# Patient Record
Sex: FEMALE | Race: WHITE | Employment: FULL TIME | URBAN - METROPOLITAN AREA
[De-identification: names, ages, dates, MRNs, and addresses within clinical notes are randomized per-mention and may not be internally consistent; named-entity substitution may affect disease eponyms.]

---

## 2017-01-06 ENCOUNTER — MYC REFILL (OUTPATIENT)
Dept: FAMILY MEDICINE | Facility: CLINIC | Age: 37
End: 2017-01-06

## 2017-01-06 DIAGNOSIS — M79.7 FIBROMYALGIA: ICD-10-CM

## 2017-01-06 RX ORDER — HYDROCODONE BITARTRATE AND ACETAMINOPHEN 5; 325 MG/1; MG/1
TABLET ORAL
Qty: 30 TABLET | Refills: 0 | Status: SHIPPED | OUTPATIENT
Start: 2017-01-06 | End: 2017-05-01

## 2017-01-06 NOTE — TELEPHONE ENCOUNTER
Rx at the  for . My Chart message sent to the pt with the note below to schedule a follow up appt.  Sara Keller,

## 2017-01-06 NOTE — TELEPHONE ENCOUNTER
Message from [employer]:  Original authorizing provider: MD Scarlet Lorenz would like a refill of the following medications:  HYDROcodone-acetaminophen (NORCO) 5-325 MG per tablet [Davide Caro MD]    Preferred pharmacy: [facility] [address] [place] [address] PRIYANKA ESTEVEZ AT [facility] [address]     Comment:

## 2017-01-06 NOTE — TELEPHONE ENCOUNTER
Controlled Substance Refill Request for Norco 5-325  Problem List Complete:  No     PROVIDER TO CONSIDER COMPLETION OF PROBLEM LIST AND OVERVIEW/CONTROLLED SUBSTANCE AGREEMENT    Last Written Prescription Date:  11/17/2016  Last Fill Quantity: 30,   # refills: 0    Last Office Visit with [facility] primary care provider: 6/1/2016    Future Office visit:     Controlled substance agreement on file: Yes:  Date 6/2/2016.     Processing:  Mail to on file pharmacy   checked in past 6 months?  Yes 01/6/2017     RX [place] ([place] reviewed:  reviewed- no concerns 1/6/2016    [place] profile:  [email or web address]  Clarice Bernabe RN  [employer] [employer]

## 2017-01-16 DIAGNOSIS — F41.1 ANXIETY STATE: Primary | ICD-10-CM

## 2017-01-17 DIAGNOSIS — M79.7 FIBROMYALGIA: Primary | ICD-10-CM

## 2017-01-17 RX ORDER — CARISOPRODOL 350 MG/1
TABLET ORAL
Qty: 21 TABLET | Refills: 0 | Status: SHIPPED | OUTPATIENT
Start: 2017-01-17 | End: 2017-05-01

## 2017-01-17 NOTE — TELEPHONE ENCOUNTER
Need follow for med check per Dr. Caro     Unable to reach patient at this time or leave message as no VM box set up.   Route to team to call patient and assist with scheduling.      Holly Jimenez RN

## 2017-01-17 NOTE — Clinical Note
[facility]  [address] [facility] [address]  [phone]        January 18, 2017  Scarlet Osborne  [address] [place] [phone]    Dear Scarlet,    I care about your health and have reviewed your health plan. I have reviewed your medical conditions, medication list, and lab results and am making recommendations based on this review, to better manage your health.    You are in particular need of attention regarding:  -Med Check    I am recommending that you:  Schedule and appt ASAP  Here is a list of Health Maintenance topics that are due now or due soon:  Health Maintenance Due   Topic Date Due     Chlamydia Screening Lab - yearly  1979     CRISELDA QUESTIONNAIRE 1 YEAR  1979     Depression Assessment - yearly  1979     URINE DRUG SCREEN Q1 YR  03/20/1994       Please call us at [phone] (or use [employer]) to address the above recommendations.     Thank you for trusting [facility] and we appreciate the opportunity to serve you.  We look forward to supporting your healthcare needs in the future.    Healthy Regards,    Davide Caro M.D.

## 2017-01-17 NOTE — TELEPHONE ENCOUNTER
CARISOPRODOL 350MG TABLETS      Last Written Prescription Date:  NA  Last Fill Quantity: NA,   # refills: NA  Last Office Visit with [facility], [facility] or [facility] prescribing provider: 6/1/2016  Future Office visit:       Routing refill request to provider for review/approval because:  Drug not active on patient's medication list

## 2017-01-17 NOTE — TELEPHONE ENCOUNTER
PAROXETINE 10MG TABLETS     Last Written Prescription Date: 10/5/2016  Last Fill Quantity: 30, # refills: 1  Last Office Visit with [facility] primary care provider:  6/1/2016        Last PHQ-9 score on record= No flowsheet data found.

## 2017-01-18 NOTE — TELEPHONE ENCOUNTER
Unable to contact the pt. Rx faxed to [facility]i [facility]. Letter mailed to the pt to schedule a med check.  Sara Keller,

## 2017-01-27 RX ORDER — PAROXETINE 10 MG/1
TABLET, FILM COATED ORAL
Qty: 30 TABLET | Refills: 0 | OUTPATIENT
Start: 2017-01-27

## 2017-02-07 DIAGNOSIS — I10 ESSENTIAL HYPERTENSION, BENIGN: Primary | ICD-10-CM

## 2017-02-08 RX ORDER — HYDROCHLOROTHIAZIDE 12.5 MG/1
TABLET ORAL
Qty: 30 TABLET | Refills: 0 | Status: SHIPPED | OUTPATIENT
Start: 2017-02-08 | End: 2017-04-19

## 2017-02-08 RX ORDER — LOSARTAN POTASSIUM 50 MG/1
TABLET ORAL
Qty: 30 TABLET | Refills: 0 | Status: SHIPPED | OUTPATIENT
Start: 2017-02-08 | End: 2017-04-22

## 2017-02-08 NOTE — TELEPHONE ENCOUNTER
HCTZ      Last Written Prescription Date: 3/8/16  Last Fill Quantity: 90, # refills: 1  Last Office Visit with [facility], [facility] or [facility] prescribing provider: 6/1/16       POTASSIUM   Date Value Ref Range Status   06/01/2016 3.7 3.4 - 5.3 mmol/L Final     CREATININE   Date Value Ref Range Status   06/01/2016 0.82 0.52 - 1.04 mg/dL Final     BP Readings from Last 3 Encounters:   06/01/16 106/69   09/03/15 104/80   07/10/14 118/82     Losartan      Last Written Prescription Date: 2/18/16  Last Fill Quantity: 90, # refills: 1  Last Office Visit with [facility], [facility] or [facility] prescribing provider: 6/1/16       POTASSIUM   Date Value Ref Range Status   06/01/2016 3.7 3.4 - 5.3 mmol/L Final     CREATININE   Date Value Ref Range Status   06/01/2016 0.82 0.52 - 1.04 mg/dL Final     BP Readings from Last 3 Encounters:   06/01/16 106/69   09/03/15 104/80   07/10/14 118/82     Teresita Rome CMA

## 2017-02-08 NOTE — TELEPHONE ENCOUNTER
"Ok x one month only- per last office visit patient due for med check in 6 months.  Called home number \" this person cannot accept calls at this time\"  Message sent by  to schedule appointment for med check  I sent a message today to schedule before the 30 day supply runs out.  Shira Devine RN  [facility]  [phone]    "

## 2017-02-13 DIAGNOSIS — F41.1 ANXIETY STATE: ICD-10-CM

## 2017-02-13 NOTE — TELEPHONE ENCOUNTER
Paroxetine      Last Written Prescription Date: 10/5/16  Last Fill Quantity: 30, # refills: 1  Last Office Visit with [facility] primary care provider:  6/1/16        Last PHQ-9 score on record= No flowsheet data found.      Teresita Rome CMA

## 2017-02-15 NOTE — TELEPHONE ENCOUNTER
Sent CRISELDA and request for OV via [employer].  Shira DevineRN  [facility]  [phone]

## 2017-02-20 RX ORDER — PAROXETINE 10 MG/1
TABLET, FILM COATED ORAL
Qty: 90 TABLET | Refills: 1 | Status: SHIPPED | OUTPATIENT
Start: 2017-02-20

## 2017-02-20 NOTE — TELEPHONE ENCOUNTER
Routing refill request to provider for review/approval because:  A break in medication  Patients number is out of service and has not reviewed [employer] message  Shira Devine RN  [facility]  [phone]

## 2017-03-04 DIAGNOSIS — F41.1 ANXIETY STATE: ICD-10-CM

## 2017-03-06 RX ORDER — ALPRAZOLAM 0.25 MG
TABLET ORAL
Qty: 30 TABLET | Refills: 0 | Status: SHIPPED | OUTPATIENT
Start: 2017-03-06

## 2017-03-06 NOTE — TELEPHONE ENCOUNTER
Alprazolam      Last Written Prescription Date:  11/7/16  Last Fill Quantity: 30,   # refills: 0  Last Office Visit with [facility], [facility] or [facility] prescribing provider: 6/1/16  Future Office visit:       Routing refill request to provider for review/approval because:  Drug not on the [facility], [facility] or [facility] refill protocol or controlled substance    Teresita Rome CMA

## 2017-04-19 DIAGNOSIS — I10 ESSENTIAL HYPERTENSION, BENIGN: ICD-10-CM

## 2017-04-20 RX ORDER — HYDROCHLOROTHIAZIDE 12.5 MG/1
TABLET ORAL
Qty: 30 TABLET | Refills: 0 | Status: SHIPPED | OUTPATIENT
Start: 2017-04-20

## 2017-04-20 NOTE — TELEPHONE ENCOUNTER
HCTZ      Last Written Prescription Date: 2/8/17  Last Fill Quantity: 30, # refills: 0  Last Office Visit with [facility], [facility] or [facility] prescribing provider: 6/1/16  Next 5 appointments (look out 90 days)     Apr 21, 2017 11:20 AM CDT   Kathy Proctor with Davide Caro MD   [facility] ([facility]    [address] [place] [id number] [phone]                   Potassium   Date Value Ref Range Status   06/01/2016 3.7 3.4 - 5.3 mmol/L Final     Creatinine   Date Value Ref Range Status   06/01/2016 0.82 0.52 - 1.04 mg/dL Final     BP Readings from Last 3 Encounters:   06/01/16 106/69   09/03/15 104/80   07/10/14 118/82     Teresita Rome [facility]

## 2017-04-22 DIAGNOSIS — I10 ESSENTIAL HYPERTENSION, BENIGN: ICD-10-CM

## 2017-04-24 RX ORDER — LOSARTAN POTASSIUM 50 MG/1
TABLET ORAL
Qty: 30 TABLET | Refills: 3 | Status: SHIPPED | OUTPATIENT
Start: 2017-04-24

## 2017-04-24 NOTE — TELEPHONE ENCOUNTER
Refill approved through [facility] protocol.  Shira Devine RN  [facility]  [phone]

## 2017-04-24 NOTE — TELEPHONE ENCOUNTER
Losartan 50 mg       Last Written Prescription Date: 2/8/17  Last Fill Quantity: 30, # refills: 0  Last Office Visit with [facility], [facility] or [facility] prescribing provider: 6/1/16       Potassium   Date Value Ref Range Status   06/01/2016 3.7 3.4 - 5.3 mmol/L Final     Creatinine   Date Value Ref Range Status   06/01/2016 0.82 0.52 - 1.04 mg/dL Final     BP Readings from Last 3 Encounters:   06/01/16 106/69   09/03/15 104/80   07/10/14 118/82

## 2017-05-01 ENCOUNTER — MYC REFILL (OUTPATIENT)
Dept: FAMILY MEDICINE | Facility: CLINIC | Age: 37
End: 2017-05-01

## 2017-05-01 DIAGNOSIS — M79.7 FIBROMYALGIA: ICD-10-CM

## 2017-05-03 RX ORDER — HYDROCODONE BITARTRATE AND ACETAMINOPHEN 5; 325 MG/1; MG/1
TABLET ORAL
Qty: 30 TABLET | Refills: 0 | Status: SHIPPED | OUTPATIENT
Start: 2017-05-03

## 2017-05-03 RX ORDER — CARISOPRODOL 350 MG/1
350 TABLET ORAL 3 TIMES DAILY PRN
Qty: 21 TABLET | Refills: 0 | Status: SHIPPED | OUTPATIENT
Start: 2017-05-03

## 2017-05-03 NOTE — TELEPHONE ENCOUNTER
Rx's at the  for . My CHart message sent to the pt to schedule a follow up with Dr Caro.  Sara Keller,

## 2017-05-03 NOTE — TELEPHONE ENCOUNTER
Message from [employer]:  Original authorizing provider: MD Scarlet Lorenz would like a refill of the following medications:  HYDROcodone-acetaminophen (NORCO) 5-325 MG per tablet [Davide Caro MD]  carisoprodol (SOMA) 350 MG tablet [Davide Caro MD]    Preferred pharmacy: [facility] [address] [place] [address] AT [facility] [address]     Comment:

## 2017-05-03 NOTE — TELEPHONE ENCOUNTER
Controlled Substance Refill Request for norco  Problem List Complete:  No     PROVIDER TO CONSIDER COMPLETION OF PROBLEM LIST AND OVERVIEW/CONTROLLED SUBSTANCE AGREEMENT    Last Written Prescription Date:  1/17/17  Last Fill Quantity: 21,   # refills: 0    Last Office Visit with [facility] primary care provider: 06/01/2016    Future Office visit:     Controlled substance agreement on file: No.     Processing:  Patient will  in clinic   checked in past 6 months?  No, route to RN-  RX monitoring program ([place]) reviewed: provider has not granted access to triage    [place]MP profile:  [email or web address]        Soma      Last Written Prescription Date:  01/06/17  Last Fill Quantity: 30,   # refills: 0  Last Office Visit with [facility], [facility] or [facility] prescribing provider: 06/01/16  Future Office visit:       Routing refill request to provider for review/approval because:  Drug not on the [facility], [facility] or [facility] refill protocol or controlled substance    Shira Devine RN  [facility]  [phone]

## 2017-06-10 ENCOUNTER — MYC REFILL (OUTPATIENT)
Dept: FAMILY MEDICINE | Facility: CLINIC | Age: 37
End: 2017-06-10

## 2017-06-10 DIAGNOSIS — F41.1 ANXIETY STATE: ICD-10-CM

## 2017-06-10 DIAGNOSIS — M79.7 FIBROMYALGIA: ICD-10-CM

## 2017-06-13 NOTE — TELEPHONE ENCOUNTER
Patient overdue for appointment - please schedule when patient calls back before sending call to triage      alprazolam      Last Written Prescription Date:  03/06/17  Last Fill Quantity: 30,   # refills: 0  Last Office Visit with [facility], [facility] or [facility] prescribing provider: 06/01/16  Future Office visit:       Routing refill request to provider for review/approval because:  Drug not on the [facility], [facility] or [facility] refill protocol or controlled substance    Controlled Substance Refill Request for hydrocodone  Problem List Complete:  No     PROVIDER TO CONSIDER COMPLETION OF PROBLEM LIST AND OVERVIEW/CONTROLLED SUBSTANCE AGREEMENT    Last Written Prescription Date:  05/03/17  Last Fill Quantity: 30,   # refills: 0    Last Office Visit with [facility] primary care provider: 06/1/16    Future Office visit:     Controlled substance agreement on file: Yes:  Date 06/1/16.     Processing:  Patient will  in clinic   checked in past 6 months?  Yes 01/2017

## 2017-06-13 NOTE — TELEPHONE ENCOUNTER
Message from [employer]oomahart:  Original authorizing provider: MD Scarlet Lorenz would like a refill of the following medications:  ALPRAZolam (XANAX) 0.25 MG tablet [Davide Caro MD]  HYDROcodone-acetaminophen (NORCO) 5-325 MG per tablet [Davide Caro MD]    Preferred pharmacy: [facility] [address] [place] [address] PRIYANKA [facility] AT [facility] [address]     Comment:

## 2017-06-16 NOTE — TELEPHONE ENCOUNTER
Patient has read her mycharts that state no more refills without an appointment.  Left message on machine to call clinic for appointment.    Shira Devine RN  [facility]  [phone]

## 2017-06-22 RX ORDER — ALPRAZOLAM 0.25 MG
0.25 TABLET ORAL EVERY 6 HOURS
Qty: 30 TABLET | Refills: 0 | Status: CANCELLED | OUTPATIENT
Start: 2017-06-22

## 2017-06-22 RX ORDER — HYDROCODONE BITARTRATE AND ACETAMINOPHEN 5; 325 MG/1; MG/1
TABLET ORAL
Qty: 30 TABLET | Refills: 0 | Status: CANCELLED | OUTPATIENT
Start: 2017-06-22

## 2017-07-03 DIAGNOSIS — M79.7 FIBROMYALGIA: ICD-10-CM

## 2017-07-05 NOTE — TELEPHONE ENCOUNTER
Tizanidine      Last Written Prescription Date:  6/22/16  Last Fill Quantity: 90,   # refills: 0  Last Office Visit with [facility] prescribing provider: 6/1/16  Future Office visit:       Routing refill request to provider for review/approval because:  Drug not on the [facility], [facility] or [facility] refill protocol or controlled substance    Teresita Rome CMA

## 2017-07-06 NOTE — TELEPHONE ENCOUNTER
Left message for patient to call clinic.    Please assist with appt.  Thank you    Holly Jimenez RN

## 2017-07-14 DIAGNOSIS — F41.1 ANXIETY STATE: ICD-10-CM

## 2017-07-14 NOTE — LETTER
[place] [id number] [place] [address]   [phone]      July 21, 2017    Scarlet Osborne  [address] [place] [id number]            Dear Ms. Osborne    Your physician requires an office visit every 12 months in order to monitor your maintenance medication(s).  Your last office visit was on 6/1/2016.  We are unable to refill your medication until you are see by a provider.  Please call the clinic at [phone] to schedule an appointment..        Sincerely,    [facility]

## 2017-07-17 NOTE — TELEPHONE ENCOUNTER
alprazolam      Last Written Prescription Date:  03/06/17  Last Fill Quantity: 30,   # refills: 0  Last Office Visit with [facility], [facility] prescribing provider: 06/2016  Future Office visit:       Routing refill request to provider for review/approval because:  Drug not on the [facility], [facility] or [facility] refill protocol or controlled substance  Last OV was  Over one year    Left message on answering machine for patient to call back.  Sent [employer] message to schedule appointment for refills

## 2017-07-21 RX ORDER — ALPRAZOLAM 0.25 MG
TABLET ORAL
Qty: 30 TABLET | Refills: 0 | OUTPATIENT
Start: 2017-07-21

## 2017-07-21 NOTE — TELEPHONE ENCOUNTER
[employer] message read by patient.  Has not f/u  Medication is being denied due to: Patient needs to be seen because it has been more than one year since last visit.    Holly Jimenez RN

## 2017-07-21 NOTE — TELEPHONE ENCOUNTER
Unable to leave message for pt.    Will mail letter.    RN please close encounter.    Teresita Rome CMA

## 2017-08-02 NOTE — TELEPHONE ENCOUNTER
[employer] message read by patient.  Has not f/u  Medication is being denied due to: Patient needs to be seen because it has been more than one year since last visit.    RN please close encounter.    Teresita Rome CMA

## 2017-08-07 ENCOUNTER — TELEPHONE (OUTPATIENT)
Dept: FAMILY MEDICINE | Facility: CLINIC | Age: 37
End: 2017-08-07

## 2017-08-07 DIAGNOSIS — M62.830 BACK MUSCLE SPASM: ICD-10-CM

## 2017-08-07 DIAGNOSIS — M79.7 FIBROMYALGIA: Primary | ICD-10-CM

## 2017-08-07 NOTE — TELEPHONE ENCOUNTER
Carisoprodol is not covered. Would you like to start a PA or alternative Rx? Ester Weiner [facility]    Phone # [phone]  ID # [id number]

## 2017-09-09 DIAGNOSIS — I10 ESSENTIAL HYPERTENSION, BENIGN: ICD-10-CM

## 2017-09-11 RX ORDER — HYDROCHLOROTHIAZIDE 12.5 MG/1
TABLET ORAL
Qty: 30 TABLET | Refills: 0 | Status: SHIPPED | OUTPATIENT
Start: 2017-09-11

## 2017-09-11 NOTE — TELEPHONE ENCOUNTER
hydrochlorothiazde      Last Written Prescription Date: 4/20/17  Last Fill Quantity: 30, # refills: 0  Last Office Visit with [facility], [facility] or [facility] prescribing provider: 6/1/16       Potassium   Date Value Ref Range Status   06/01/2016 3.7 3.4 - 5.3 mmol/L Final     Creatinine   Date Value Ref Range Status   06/01/2016 0.82 0.52 - 1.04 mg/dL Final     BP Readings from Last 3 Encounters:   06/01/16 106/69   09/03/15 104/80   07/10/14 118/82     Routing refill request to provider for review/approval because:  Patient needs to be seen because it has been more than 1 year since last office visit.  Mally given and has not followed up.  Routing to PCP and Team 3.  Kanwal Horvath RN

## 2017-10-20 DIAGNOSIS — I10 ESSENTIAL HYPERTENSION, BENIGN: ICD-10-CM

## 2017-10-23 RX ORDER — HYDROCHLOROTHIAZIDE 12.5 MG/1
TABLET ORAL
Qty: 30 TABLET | Refills: 0 | Status: SHIPPED | OUTPATIENT
Start: 2017-10-23

## 2017-10-23 NOTE — TELEPHONE ENCOUNTER
Routing refill request to provider for review/approval because:  Patient needs to be seen because it has been more than 1 year since last office visit.             Potassium   Date Value Ref Range Status   06/01/2016 3.7 3.4 - 5.3 mmol/L Final     Creatinine   Date Value Ref Range Status   06/01/2016 0.82 0.52 - 1.04 mg/dL Final     BP Readings from Last 3 Encounters:   06/01/16 106/69   09/03/15 104/80   07/10/14 118/82

## 2017-11-24 DIAGNOSIS — M79.7 FIBROMYALGIA: ICD-10-CM

## 2017-11-24 NOTE — LETTER
[facility]  [address] [facility] [address]  [phone]        December 6, 2017  Scarlet Osborne  [address] [place] [phone]    Dear Scarlet,    I care about your health and have reviewed your health plan. I have reviewed your medical conditions, medication list, and lab results and am making recommendations based on this review, to better manage your health.    You are in particular need of attention regarding:  -Wellness (Physical) Visit     I am recommending that you:    -Schedule a physical and med check    Here is a list of Health Maintenance topics that are due now or due soon:  Health Maintenance Due   Topic Date Due     URINE DRUG SCREEN Q1 YR  03/20/1994     Depression Assessment - yearly  03/20/1997     Flu Vaccine - yearly  09/01/2017       Please call us at [phone] (or use [employer]) to address the above recommendations.     Thank you for trusting [facility] and we appreciate the opportunity to serve you.  We look forward to supporting your healthcare needs in the future.    Healthy Regards,    Davide Caro M.D.

## 2017-11-29 RX ORDER — CARISOPRODOL 350 MG/1
TABLET ORAL
Qty: 21 TABLET | Refills: 0 | OUTPATIENT
Start: 2017-11-29

## 2017-12-25 DIAGNOSIS — I10 ESSENTIAL HYPERTENSION, BENIGN: ICD-10-CM

## 2017-12-25 NOTE — LETTER
[facility]  [address] [facility] [address]  [phone]        January 3, 2018  Scarlet Osborne  [address] [place] [id number]    Dear Scarlet,    I care about your health and have reviewed your health plan. I have reviewed your medical conditions, medication list, and lab results and am making recommendations based on this review, to better manage your health.    You are in particular need of attention regarding:  {Topics:110240}    I am recommending that you:  {recommendations:751801}    Here is a list of Health Maintenance topics that are due now or due soon:  Health Maintenance Due   Topic Date Due     URINE DRUG SCREEN Q1 YR  03/20/1994     Depression Assessment - yearly  03/20/1997     Flu Vaccine - yearly  09/01/2017       Please call us at [phone] (or use [email or web address]) to address the above recommendations.     Thank you for trusting [facility] and we appreciate the opportunity to serve you.  We look forward to supporting your healthcare needs in the future.    Healthy Regards,    {[facility] PROVIDERS:[id number]}

## 2017-12-26 NOTE — TELEPHONE ENCOUNTER
Losartan       Last Written Prescription Date: 4/24/17  Last Fill Quantity: 30,  # refills: 3   Last Office Visit with [facility], [facility] or [facility] prescribing provider: 6/1/16.    Last OV with BP being addressed in Sep 2015    Need OV for refill.  Please call and assist with appt.  Thank you    Requested Prescriptions   Pending Prescriptions Disp Refills     losartan (COZAAR) 50 MG tablet [Pharmacy Med Name: LOSARTAN 50MG TABLETS] 30 tablet 0     Sig: TAKE 1 TABLET BY MOUTH DAILY    Angiotensin-II Receptors Failed    12/25/2017  1:34 PM       Failed - Blood pressure under 140/90 in past 12 months.    BP Readings from Last 3 Encounters:   06/01/16 106/69   09/03/15 104/80   07/10/14 118/82                Failed - Recent or future visit with authorizing provider's specialty    Patient had office visit in the last year or has a visit in the next 30 days with authorizing provider.  See chart review.              Failed - Normal serum creatinine on file in past 12 months    Recent Labs   Lab Test  06/01/16   1102   CR  0.82            Failed - Normal serum potassium on file in past 12 months    Recent Labs   Lab Test  06/01/16   1102   POTASSIUM  3.7                   Passed - Patient is age 18 or older       Passed - No active pregnancy on record       Passed - No positive pregnancy test in past 12 months                                                   Holly Jimenez RN

## 2018-01-03 ENCOUNTER — TELEPHONE (OUTPATIENT)
Dept: FAMILY MEDICINE | Facility: CLINIC | Age: 38
End: 2018-01-03

## 2018-01-03 NOTE — LETTER
[facility]  [address] [facility] [address]  [phone]        January 3, 2018  Scarlet Osborne  [address] [place] [phone]    Dear Scarlet,    I care about your health and have reviewed your health plan. I have reviewed your medical conditions, medication list, and lab results and am making recommendations based on this review, to better manage your health.    You are in particular need of attention regarding:  -medication losartan      I am recommending that you:  Make an appointment so we can renew your medications     Here is a list of Health Maintenance topics that are due now or due soon:  Health Maintenance Due   Topic Date Due     URINE DRUG SCREEN Q1 YR  03/20/1994     Depression Assessment - yearly  03/20/1997     Flu Vaccine - yearly  09/01/2017       Please call us at [phone] (or use [email or web address]) to address the above recommendations.     Thank you for trusting [facility] and we appreciate the opportunity to serve you.  We look forward to supporting your healthcare needs in the future.    Healthy Regards,    [facility]:

## 2018-01-03 NOTE — TELEPHONE ENCOUNTER
There is no medication documented or pended anywhere in this encounter. Routing back to the creator of this encounter to advise which medication patient needs.   Vania Solomon RN   [facility] - Triage

## 2018-01-08 RX ORDER — LOSARTAN POTASSIUM 50 MG/1
TABLET ORAL
Qty: 30 TABLET | Refills: 0 | OUTPATIENT
Start: 2018-01-08

## 2018-02-20 DIAGNOSIS — F41.1 ANXIETY STATE: ICD-10-CM

## 2018-02-20 NOTE — LETTER
March 8, 2018      Scarlet Osborne  [address] [place] [phone]        Dear Scarlet,   We have attempted to contact you regarding a prescription refill request we have on file.  At this time we are not able to fill this medication until we see you in the office for a med check.  Please call us at [phone] to schedule an appointment or with any questions or concerns.    Thank you for choosing [facility]            Sincerely,        Davide Caro MD

## 2018-02-21 NOTE — TELEPHONE ENCOUNTER
"Requested Prescriptions   Pending Prescriptions Disp Refills     PARoxetine (PAXIL) 10 MG tablet [Pharmacy Med Name: PAROXETINE 10MG TABLETS]  Last Written Prescription Date:  2/20/17  Last Fill Quantity: 90 TABLET,  # refills: 1   Last office visit: 6/1/2016 with prescribing provider:  6/1/16   Future Office Visit:     90 tablet 0     Sig: TAKE 1 TABLET(10 MG) BY MOUTH AT BEDTIME    SSRIs Protocol Failed    2/20/2018  7:06 PM  No flowsheet data found.    CRISELDA-7 SCORE 4/2/2017   Total Score 10 (moderate anxiety)              Failed - Recent or future visit with authorizing provider    Patient had office visit in the last year or has a visit in the next 30 days with authorizing provider.  See \"Patient Info\" tab in inbasket, or \"Choose Columns\" in Meds & Orders section of the refill encounter.            Passed - Patient is age 18 or older       Passed - No active pregnancy on record       Passed - No positive pregnancy test in last 12 months          "

## 2018-02-21 NOTE — TELEPHONE ENCOUNTER
Depression is not on the problem list.  Looks like break in medication.  Left non-detailed message to call back and ask to speak with a triage nurse.   Kanwal Horvath RN

## 2018-02-23 NOTE — TELEPHONE ENCOUNTER
Needs appointment - has NOT been seen since June 2016  Please assist with appt.  Thank you    Holly Jimenez RN

## 2018-03-08 RX ORDER — PAROXETINE 10 MG/1
TABLET, FILM COATED ORAL
Qty: 90 TABLET | Refills: 0 | OUTPATIENT
Start: 2018-03-08

## 2018-03-08 NOTE — TELEPHONE ENCOUNTER
Medication removed with reply to pharmacy that patient need an appointment  Clarice Bernabe RN - Triage  [facility]

## 2018-08-20 DIAGNOSIS — M79.7 FIBROMYALGIA: ICD-10-CM

## 2018-08-20 DIAGNOSIS — M62.830 BACK MUSCLE SPASM: ICD-10-CM

## 2018-08-20 NOTE — LETTER
[facility]  [address] [facility] [address]  [phone]        August 24, 2018  Scarlet Osborne  [address] [facility] [address]    Dear Scarlet,      We have tried to reach you multiple times regarding a refill request. Your chart indicates that you are due for an office visit and in order for us to continue refilling your medication you will need to schedule an appointment with a provider.       Here is a list of Health Maintenance topics that are due now or due soon:  Health Maintenance Due   Topic Date Due     URINE DRUG SCREEN Q1 YR  03/20/1994     HIV SCREEN (SYSTEM ASSIGNED)  03/20/1997     Depression Assessment - yearly  03/20/1997     CRISELDA QUESTIONNAIRE 1 YEAR  02/15/2018       Please call us at [phone] (or use [employer]) to address the above recommendations.     Thank you for trusting [facility] and we appreciate the opportunity to serve you.  We look forward to supporting your healthcare needs in the future.    Healthy Regards,    [facility]

## 2018-08-20 NOTE — TELEPHONE ENCOUNTER
zanaflex      Last Written Prescription Date:  8/7/17  Last Fill Quantity: 30,   # refills: 0  Last Office Visit: 6/1/2016  Future Office visit:       Patient has not been seen for over 2 years. Request denied. Routing to team to inform and assist in scheduling.   Vania Solomon RN   [facility] - Triage

## 2018-09-30 ENCOUNTER — TRANSFERRED RECORDS (OUTPATIENT)
Dept: HEALTH INFORMATION MANAGEMENT | Facility: CLINIC | Age: 38
End: 2018-09-30

## 2019-11-05 ENCOUNTER — HEALTH MAINTENANCE LETTER (OUTPATIENT)
Age: 39
End: 2019-11-05

## 2020-11-22 ENCOUNTER — HEALTH MAINTENANCE LETTER (OUTPATIENT)
Age: 40
End: 2020-11-22

## 2021-09-19 ENCOUNTER — HEALTH MAINTENANCE LETTER (OUTPATIENT)
Age: 41
End: 2021-09-19

## 2022-01-09 ENCOUNTER — HEALTH MAINTENANCE LETTER (OUTPATIENT)
Age: 42
End: 2022-01-09

## 2022-11-20 ENCOUNTER — HEALTH MAINTENANCE LETTER (OUTPATIENT)
Age: 42
End: 2022-11-20

## 2023-04-15 ENCOUNTER — HEALTH MAINTENANCE LETTER (OUTPATIENT)
Age: 43
End: 2023-04-15

## 2024-02-03 ENCOUNTER — HEALTH MAINTENANCE LETTER (OUTPATIENT)
Age: 44
End: 2024-02-03

## 2024-06-22 ENCOUNTER — HEALTH MAINTENANCE LETTER (OUTPATIENT)
Age: 44
End: 2024-06-22

## 2025-02-01 ENCOUNTER — MYC REFILL (OUTPATIENT)
Dept: FAMILY MEDICINE | Facility: CLINIC | Age: 45
End: 2025-02-01

## 2025-02-01 DIAGNOSIS — F41.1 ANXIETY STATE: ICD-10-CM

## 2025-02-01 DIAGNOSIS — M79.7 FIBROMYALGIA: ICD-10-CM

## 2025-02-01 RX ORDER — ALPRAZOLAM 0.25 MG
0.25 TABLET ORAL EVERY 6 HOURS
Qty: 30 TABLET | Refills: 0 | Status: CANCELLED | OUTPATIENT
Start: 2025-02-01

## 2025-02-01 RX ORDER — PAROXETINE 10 MG/1
TABLET, FILM COATED ORAL
Qty: 90 TABLET | Refills: 1 | Status: CANCELLED | OUTPATIENT
Start: 2025-02-01

## 2025-02-03 NOTE — TELEPHONE ENCOUNTER
Clinic RN: Please investigate patient's chart or contact patient if the information cannot be found because the medication is listed as historical or discontinued. Confirm patient is taking this medication. Document findings and route refill encounter to provider for approval or denial.  Last ordered 8 years ago?

## 2025-02-03 NOTE — TELEPHONE ENCOUNTER
Clinic RN: Please investigate patient's chart or contact patient if the information cannot be found because the medication is listed as historical or discontinued. Confirm patient is taking this medication. Document findings and route refill encounter to provider for approval or denial.    Last ordered 7 years ago?

## 2025-02-03 NOTE — TELEPHONE ENCOUNTER
Clinic RN: Please investigate patient's chart or contact patient if the information cannot be found because  Pt hasn't been seen in 9 years?  Has upcoming visit - if out of med will need a video visit with someone

## 2025-04-17 ENCOUNTER — VIRTUAL VISIT (OUTPATIENT)
Dept: FAMILY MEDICINE | Facility: CLINIC | Age: 45
End: 2025-04-17
Payer: COMMERCIAL

## 2025-04-17 DIAGNOSIS — M79.7 FIBROMYALGIA: Primary | ICD-10-CM

## 2025-04-17 DIAGNOSIS — F41.1 ANXIETY STATE: ICD-10-CM

## 2025-04-17 DIAGNOSIS — F43.0 STRESS REACTION: ICD-10-CM

## 2025-04-17 RX ORDER — HYDROXYZINE HYDROCHLORIDE 25 MG/1
25 TABLET, FILM COATED ORAL 3 TIMES DAILY PRN
Qty: 90 TABLET | Refills: 1 | Status: SHIPPED | OUTPATIENT
Start: 2025-04-17

## 2025-04-17 RX ORDER — PAROXETINE 10 MG/1
20 TABLET, FILM COATED ORAL EVERY MORNING
Qty: 90 TABLET | Refills: 0 | Status: SHIPPED | OUTPATIENT
Start: 2025-04-17

## 2025-04-17 ASSESSMENT — PATIENT HEALTH QUESTIONNAIRE - PHQ9
SUM OF ALL RESPONSES TO PHQ QUESTIONS 1-9: 14
10. IF YOU CHECKED OFF ANY PROBLEMS, HOW DIFFICULT HAVE THESE PROBLEMS MADE IT FOR YOU TO DO YOUR WORK, TAKE CARE OF THINGS AT HOME, OR GET ALONG WITH OTHER PEOPLE: VERY DIFFICULT
SUM OF ALL RESPONSES TO PHQ QUESTIONS 1-9: 14

## 2025-04-17 ASSESSMENT — ENCOUNTER SYMPTOMS: NERVOUS/ANXIOUS: 1

## 2025-04-17 ASSESSMENT — ANXIETY QUESTIONNAIRES
4. TROUBLE RELAXING: SEVERAL DAYS
2. NOT BEING ABLE TO STOP OR CONTROL WORRYING: SEVERAL DAYS
IF YOU CHECKED OFF ANY PROBLEMS ON THIS QUESTIONNAIRE, HOW DIFFICULT HAVE THESE PROBLEMS MADE IT FOR YOU TO DO YOUR WORK, TAKE CARE OF THINGS AT HOME, OR GET ALONG WITH OTHER PEOPLE: EXTREMELY DIFFICULT
7. FEELING AFRAID AS IF SOMETHING AWFUL MIGHT HAPPEN: NEARLY EVERY DAY
1. FEELING NERVOUS, ANXIOUS, OR ON EDGE: NEARLY EVERY DAY
8. IF YOU CHECKED OFF ANY PROBLEMS, HOW DIFFICULT HAVE THESE MADE IT FOR YOU TO DO YOUR WORK, TAKE CARE OF THINGS AT HOME, OR GET ALONG WITH OTHER PEOPLE?: EXTREMELY DIFFICULT
5. BEING SO RESTLESS THAT IT IS HARD TO SIT STILL: NOT AT ALL
GAD7 TOTAL SCORE: 12
GAD7 TOTAL SCORE: 12
3. WORRYING TOO MUCH ABOUT DIFFERENT THINGS: NEARLY EVERY DAY
6. BECOMING EASILY ANNOYED OR IRRITABLE: SEVERAL DAYS
GAD7 TOTAL SCORE: 12
7. FEELING AFRAID AS IF SOMETHING AWFUL MIGHT HAPPEN: NEARLY EVERY DAY

## 2025-04-17 NOTE — PROGRESS NOTES
Meghana is a 45 year old who is being evaluated via a billable video visit.    How would you like to obtain your AVS? MyChart  If the video visit is dropped, the invitation should be resent by: Text to cell phone: [phone]  Will anyone else be joining your video visit? No      Assessment & Plan     Fibromyalgia  Has been stable   - PARoxetine (PAXIL) 10 MG tablet; Take 2 tablets (20 mg) by mouth every morning.  - hydrOXYzine HCl (ATARAX) 25 MG tablet; Take 1 tablet (25 mg) by mouth 3 times daily as needed for itching.  - Adult Mental Health  Referral; Future    Anxiety state  Wosening with financial status, will have her to resume selective serotonin reuptake inhibitor and hydroxyzine prn, and recheck in 4-6 weeks with CPE  Will also have her to start BCT  - PARoxetine (PAXIL) 10 MG tablet; Take 2 tablets (20 mg) by mouth every morning.  - hydrOXYzine HCl (ATARAX) 25 MG tablet; Take 1 tablet (25 mg) by mouth 3 times daily as needed for itching.  - Adult Mental Health  Referral; Future    Stress reaction  Mentioned above   - PARoxetine (PAXIL) 10 MG tablet; Take 2 tablets (20 mg) by mouth every morning.  - hydrOXYzine HCl (ATARAX) 25 MG tablet; Take 1 tablet (25 mg) by mouth 3 times daily as needed for itching.  - Adult Mental Health  Referral; Future          Nicotine/Tobacco Cessation  She reports that she has been smoking. She has been exposed to tobacco smoke. She has never used smokeless tobacco.  Nicotine/Tobacco Cessation Plan  Information offered: Patient not interested at this time      Depression Screening Follow Up        4/17/2025     3:16 PM   PHQ   PHQ-9 Total Score 14    Q9: Thoughts of better off dead/self-harm past 2 weeks Not at all        Proxy-reported         4/17/2025     3:16 PM   Last PHQ-9   1.  Little interest or pleasure in doing things 1    2.  Feeling down, depressed, or hopeless 3    3.  Trouble falling or staying asleep, or sleeping too much 3    4.  Feeling  tired or having little energy 2    5.  Poor appetite or overeating 2    6.  Feeling bad about yourself 1    7.  Trouble concentrating 2    8.  Moving slowly or restless 0    Q9: Thoughts of better off dead/self-harm past 2 weeks 0    PHQ-9 Total Score 14        Proxy-reported         Follow-up    4-6 weeks     Lois Kowalski is a 45 year old, presenting for the following health issues:  Recheck Medication, Depression, and Anxiety        4/17/2025     3:16 PM   Additional Questions   Roomed by Horacio     Anxiety    History of Present Illness       Mental Health Follow-up:  Patient presents to follow-up on Depression & Anxiety.Patient's depression since last visit has been:  Medium  The patient is having other symptoms associated with depression.  Patient's anxiety since last visit has been:  Bad  The patient is having other symptoms associated with anxiety.  Any significant life events: grief or loss  Patient is feeling anxious or having panic attacks.  Patient has no concerns about alcohol or drug use.    Reason for visit:  Medicaton check    She eats 2-3 servings of fruits and vegetables daily.She consumes 3 sweetened beverage(s) daily.She exercises with enough effort to increase her heart rate 9 or less minutes per day.  She exercises with enough effort to increase her heart rate 3 or less days per week.   She is taking medications regularly.            Review of Systems  Constitutional, HEENT, cardiovascular, pulmonary, GI, , musculoskeletal, neuro, skin, endocrine and psych systems are negative, except as otherwise noted.      Objective           Vitals:  No vitals were obtained today due to virtual visit.    Physical Exam   GENERAL: alert and no distress  EYES: Eyes grossly normal to inspection.  No discharge or erythema, or obvious scleral/conjunctival abnormalities.  RESP: No audible wheeze, cough, or visible cyanosis.    SKIN: Visible skin clear. No significant rash, abnormal pigmentation or  lesions.  NEURO: Cranial nerves grossly intact.  Mentation and speech appropriate for age.  PSYCH: Appropriate affect, tone, and pace of words          Video-Visit Details    Type of service:  Video Visit   Originating Location (pt. Location): Home    Distant Location (provider location):  On-site  Platform used for Video Visit: Nury  Signed Electronically by: Davide Caro MD

## 2025-04-30 ENCOUNTER — TELEPHONE (OUTPATIENT)
Dept: FAMILY MEDICINE | Facility: CLINIC | Age: 45
End: 2025-04-30
Payer: COMMERCIAL

## 2025-05-02 NOTE — TELEPHONE ENCOUNTER
PA Initiation    Medication: PAROXETINE HCL 10 MG PO TABS  Insurance Company: [employer] - Phone [phone] Fax [phone]  Pharmacy Filling the Rx: [id number] - [place] - [address]   Filling Pharmacy Phone: [phone]  Filling Pharmacy Fax: [phone]  Start Date: 5/2/2025

## 2025-05-06 ENCOUNTER — TELEPHONE (OUTPATIENT)
Dept: PSYCHOLOGY | Facility: CLINIC | Age: 45
End: 2025-05-06
Payer: COMMERCIAL

## 2025-05-06 NOTE — TELEPHONE ENCOUNTER
Prior Authorization Approval    Medication: PAROXETINE HCL 10 MG PO TABS  Authorization Effective Date: 2/2/2025  Authorization Expiration Date: 5/3/2026  Approved Dose/Quantity: as written  Reference #: [id number]   Insurance Company: [employer] - Phone [phone] Fax [phone]  Which Pharmacy is filling the prescription: [id number] - [place], [place] - [address] [facility] AT [facility] [address]   Pharmacy Notified: y  Patient Notified: Instructed pharmacy to notify patient once order is ready.

## 2025-08-23 ENCOUNTER — HEALTH MAINTENANCE LETTER (OUTPATIENT)
Age: 45
End: 2025-08-23

## 2025-08-25 ENCOUNTER — VIRTUAL VISIT (OUTPATIENT)
Dept: PSYCHOLOGY | Facility: CLINIC | Age: 45
End: 2025-08-25
Payer: COMMERCIAL

## 2025-08-25 DIAGNOSIS — F41.1 GAD (GENERALIZED ANXIETY DISORDER): Primary | ICD-10-CM

## 2025-08-25 PROCEDURE — 90834 PSYTX W PT 45 MINUTES: CPT | Mod: 95 | Performed by: STUDENT IN AN ORGANIZED HEALTH CARE EDUCATION/TRAINING PROGRAM

## 2025-08-25 ASSESSMENT — COLUMBIA-SUICIDE SEVERITY RATING SCALE - C-SSRS
1. HAVE YOU WISHED YOU WERE DEAD OR WISHED YOU COULD GO TO SLEEP AND NOT WAKE UP?: NO
TOTAL  NUMBER OF INTERRUPTED ATTEMPTS LIFETIME: NO
6. HAVE YOU EVER DONE ANYTHING, STARTED TO DO ANYTHING, OR PREPARED TO DO ANYTHING TO END YOUR LIFE?: NO
2. HAVE YOU ACTUALLY HAD ANY THOUGHTS OF KILLING YOURSELF?: NO
TOTAL  NUMBER OF ABORTED OR SELF INTERRUPTED ATTEMPTS LIFETIME: NO
ATTEMPT LIFETIME: NO

## 2025-08-25 ASSESSMENT — ANXIETY QUESTIONNAIRES
7. FEELING AFRAID AS IF SOMETHING AWFUL MIGHT HAPPEN: NEARLY EVERY DAY
6. BECOMING EASILY ANNOYED OR IRRITABLE: NEARLY EVERY DAY
3. WORRYING TOO MUCH ABOUT DIFFERENT THINGS: NEARLY EVERY DAY
5. BEING SO RESTLESS THAT IT IS HARD TO SIT STILL: MORE THAN HALF THE DAYS
GAD7 TOTAL SCORE: 18
4. TROUBLE RELAXING: SEVERAL DAYS
1. FEELING NERVOUS, ANXIOUS, OR ON EDGE: NEARLY EVERY DAY
2. NOT BEING ABLE TO STOP OR CONTROL WORRYING: NEARLY EVERY DAY
GAD7 TOTAL SCORE: 18

## 2025-08-25 ASSESSMENT — PATIENT HEALTH QUESTIONNAIRE - PHQ9
SUM OF ALL RESPONSES TO PHQ QUESTIONS 1-9: 8
10. IF YOU CHECKED OFF ANY PROBLEMS, HOW DIFFICULT HAVE THESE PROBLEMS MADE IT FOR YOU TO DO YOUR WORK, TAKE CARE OF THINGS AT HOME, OR GET ALONG WITH OTHER PEOPLE: EXTREMELY DIFFICULT
SUM OF ALL RESPONSES TO PHQ QUESTIONS 1-9: 8

## 2025-09-02 ENCOUNTER — VIRTUAL VISIT (OUTPATIENT)
Dept: PSYCHOLOGY | Facility: CLINIC | Age: 45
End: 2025-09-02
Payer: COMMERCIAL

## 2025-09-02 DIAGNOSIS — F32.1 CURRENT MODERATE EPISODE OF MAJOR DEPRESSIVE DISORDER, UNSPECIFIED WHETHER RECURRENT (H): ICD-10-CM

## 2025-09-02 DIAGNOSIS — F41.1 GAD (GENERALIZED ANXIETY DISORDER): ICD-10-CM

## 2025-09-02 DIAGNOSIS — F43.10 PTSD (POST-TRAUMATIC STRESS DISORDER): Primary | ICD-10-CM

## 2025-09-02 PROCEDURE — 90791 PSYCH DIAGNOSTIC EVALUATION: CPT | Mod: 95 | Performed by: STUDENT IN AN ORGANIZED HEALTH CARE EDUCATION/TRAINING PROGRAM

## 2025-09-02 ASSESSMENT — ANXIETY QUESTIONNAIRES
4. TROUBLE RELAXING: SEVERAL DAYS
7. FEELING AFRAID AS IF SOMETHING AWFUL MIGHT HAPPEN: MORE THAN HALF THE DAYS
GAD7 TOTAL SCORE: 13
8. IF YOU CHECKED OFF ANY PROBLEMS, HOW DIFFICULT HAVE THESE MADE IT FOR YOU TO DO YOUR WORK, TAKE CARE OF THINGS AT HOME, OR GET ALONG WITH OTHER PEOPLE?: SOMEWHAT DIFFICULT
6. BECOMING EASILY ANNOYED OR IRRITABLE: MORE THAN HALF THE DAYS
IF YOU CHECKED OFF ANY PROBLEMS ON THIS QUESTIONNAIRE, HOW DIFFICULT HAVE THESE PROBLEMS MADE IT FOR YOU TO DO YOUR WORK, TAKE CARE OF THINGS AT HOME, OR GET ALONG WITH OTHER PEOPLE: SOMEWHAT DIFFICULT
1. FEELING NERVOUS, ANXIOUS, OR ON EDGE: NEARLY EVERY DAY
GAD7 TOTAL SCORE: 13
7. FEELING AFRAID AS IF SOMETHING AWFUL MIGHT HAPPEN: MORE THAN HALF THE DAYS
GAD7 TOTAL SCORE: 13
5. BEING SO RESTLESS THAT IT IS HARD TO SIT STILL: SEVERAL DAYS
2. NOT BEING ABLE TO STOP OR CONTROL WORRYING: MORE THAN HALF THE DAYS
3. WORRYING TOO MUCH ABOUT DIFFERENT THINGS: MORE THAN HALF THE DAYS

## 2025-09-02 ASSESSMENT — PATIENT HEALTH QUESTIONNAIRE - PHQ9
SUM OF ALL RESPONSES TO PHQ QUESTIONS 1-9: 7
10. IF YOU CHECKED OFF ANY PROBLEMS, HOW DIFFICULT HAVE THESE PROBLEMS MADE IT FOR YOU TO DO YOUR WORK, TAKE CARE OF THINGS AT HOME, OR GET ALONG WITH OTHER PEOPLE: SOMEWHAT DIFFICULT
SUM OF ALL RESPONSES TO PHQ QUESTIONS 1-9: 7